# Patient Record
Sex: FEMALE | URBAN - METROPOLITAN AREA
[De-identification: names, ages, dates, MRNs, and addresses within clinical notes are randomized per-mention and may not be internally consistent; named-entity substitution may affect disease eponyms.]

---

## 2020-10-23 ENCOUNTER — OFFICE VISIT (OUTPATIENT)
Dept: URBAN - METROPOLITAN AREA CLINIC 35 | Facility: CLINIC | Age: 80
End: 2020-10-23
Payer: MEDICARE

## 2020-10-23 DIAGNOSIS — H43.812 VITREOUS DEGENERATION, LEFT EYE: ICD-10-CM

## 2020-10-23 DIAGNOSIS — H35.3132 NONEXUDATIVE AGE-RELATED MACULAR DEGENERATION, BILATERAL, INTERMEDIATE DRY STAGE: Primary | ICD-10-CM

## 2020-10-23 PROCEDURE — 92134 CPTRZ OPH DX IMG PST SGM RTA: CPT | Performed by: OPHTHALMOLOGY

## 2020-10-23 PROCEDURE — 99204 OFFICE O/P NEW MOD 45 MIN: CPT | Performed by: OPHTHALMOLOGY

## 2020-10-23 ASSESSMENT — INTRAOCULAR PRESSURE
OD: 13
OS: 18

## 2020-10-23 NOTE — IMPRESSION/PLAN
Impression: Nonexudative age-related macular degeneration, bilateral, intermediate dry stage: A03.6171.
s/p macular hole surery OD Plan: Joyce Sees, thanks for referring this nice woman back to see me. She's doing as well as possible without active CNVM OU. OCT confirms no IRF/SRF OU. We discussed dry vs wet AMD, and I explained to the patient that currently there is no retinal treatment for dry AMD at this time. OCT shows no IRF/SRF OU and closed hole OD. I recommend the patient take the AREDS formula anti-oxidant vitamins and to continue weekly self-monitoring for progression with the amsler grid. The patient understands that smoking is the most significant modifiable risk factor for the development of advanced AMD, and also understands that if they do smoke, they cannot take high doses of vitamin A/beta-carotene, since it may increase their risk for lung cancer. If there are any changes on the amsler grid, distortion or decreased vision, the patient was encouraged to contact our office immediately. Otherwise, she will f/u with your excellent care. thanks Joyce Ibarra RTC PRN